# Patient Record
Sex: MALE | Race: OTHER | NOT HISPANIC OR LATINO | URBAN - METROPOLITAN AREA
[De-identification: names, ages, dates, MRNs, and addresses within clinical notes are randomized per-mention and may not be internally consistent; named-entity substitution may affect disease eponyms.]

---

## 2017-07-19 VITALS
HEART RATE: 65 BPM | DIASTOLIC BLOOD PRESSURE: 56 MMHG | SYSTOLIC BLOOD PRESSURE: 110 MMHG | WEIGHT: 164.02 LBS | RESPIRATION RATE: 18 BRPM | TEMPERATURE: 97 F | HEIGHT: 65 IN

## 2017-07-19 RX ORDER — CHLORHEXIDINE GLUCONATE 213 G/1000ML
1 SOLUTION TOPICAL ONCE
Qty: 0 | Refills: 0 | Status: DISCONTINUED | OUTPATIENT
Start: 2017-07-20 | End: 2017-07-21

## 2017-07-19 NOTE — H&P ADULT - PMH
Coronary artery disease    Hyperlipidemia    Hypertension    Systolic CHF, chronic    Vitamin B12 deficiency    Vitamin D deficiency

## 2017-07-19 NOTE — H&P ADULT - ASSESSMENT
62 y.o English/Welsh Speaking Male Current Smoker with PMHx of hypertension, hyperlipidemia, Chronic Systolic HF with unknown EF s/p ICD in ~2015 (last interrogated ~ 2weeks ago-no events noted as per pt's daughter), Known CAD with hx of STEMI 09/2013 s/p KB thrombectomy/KB mRCA @ [facility] [place] on 09/02/13  followed by diagnostic cardiac cath @ [facility] [place] on 10/02/13 for CP post MI revealed patent RCA stent, who presents for recommended Cardiac Cath with possible intervention if clinically indicated secondary to CCS Anginal Class 4 symptoms in the setting of an abnormal Stress test.        ASA: III and Mallampati: III  ***OF NOTE: Pt was loaded with ASA 325mg PO X 1 and Plavix 600mg PO X 1 prior to procedure today. 62 y.o English/Albanian Speaking Male Current Smoker with PMHx of hypertension, hyperlipidemia, Chronic Systolic HF with unknown EF s/p ICD in ~2016 (last interrogated ~ 2 months ago-no events noted as per pt's daughter), Known CAD with hx of STEMI 09/2013 s/p KB thrombectomy/KB mRCA @ [facility] on 09/02/13  followed by diagnostic cardiac cath @ [facility] on 10/02/13 for CP post MI revealed patent RCA stent, who presents for recommended Cardiac Cath with possible intervention if clinically indicated secondary to CCS Anginal Class 4 symptoms in the setting of an abnormal Stress test.        ASA: III and Mallampati: III  ***OF NOTE: Pre-procedure K+ = 6.2  hemolyzed, repeat to be drawn the room. Pt was loaded with ASA 325mg PO X 1 and Plavix 600mg PO X 1 prior to procedure today.

## 2017-07-19 NOTE — H&P ADULT - NSHPSOCIALHISTORY_GEN_ALL_CORE
Current smoker.  Smokes 1.5PPD X 50yrs, currently uses e-cigarettes/day  Denies  ETOH or illicit drug use

## 2017-07-19 NOTE — H&P ADULT - HISTORY OF PRESENT ILLNESS
****HISTORY OBTAINED FROM PATIENT'S DAUGHTER ADELINA LINCOLN (POOR HISTORIAN) AND SUPPLEMENTED AS PER VERBAL DISCUSSION WITH REFERRING DR. ALEMAN    ****PT TO BRING IN STENT CARD      62 y.o English speaking Male Current Smoker with PMHx of hypertension, hyperlipidemia, Chronic Systolic HF with unknown EF s/p ICD in ~2015 (last interrogated ~ 2weeks ago-no events noted as per pt's daughter), who as per pt's daughter  presented to his cardiologist Dr. Aleman c/o non radiating sub sternal CP described as a pressure like sensation with accompanying palpitations occurring independent of physical activity over the past few months.  Symptoms last a few minutes and improve with rest or spontaneously on their own.  Denies SOB, dizziness, syncope, recent PND/orthopnea,  or LE edema.  Nuclear Stress test performed 07/08/17 revealed large inferior infarct with ischemia, no EF commented on.        In light of pt's risk factors, above CCS Anginal Class 4 Anginal symptoms, and an abnormal Stress test, pt is now referred to [facility] for recommended Cardiac Cath with possible intervention if clinically indicated to  r/o suspected progressive CAD. ****HISTORY OBTAINED FROM PATIENT'S DAUGHTER ADELINA LINCOLN (POOR HISTORIAN) AND SUPPLEMENTED AS PER VERBAL DISCUSSION WITH REFERRING DR. ALEMAN      62 y.o English/Slovak Speaking Male Current Smoker with PMHx of hypertension, hyperlipidemia, Chronic Systolic HF with unknown EF s/p ICD in ~2015 (last interrogated ~ 2weeks ago-no events noted as per pt's daughter), Known CAD with hx of STEMI 09/2013 s/p KB thrombectomy/KB mRCA @ [facility] on 09/02/13  followed by diagnostic cardiac cath @ [facility] on 10/02/13 for CP post MI revealed patent RCA stent, who as per pt's daughter  presented to his cardiologist Dr. Aleman c/o non radiating sub sternal CP described as a pressure like sensation with accompanying palpitations occurring independent of physical activity over the past few months.  Symptoms last a few minutes and improve with rest or spontaneously on their own.  Denies SOB, dizziness, syncope, recent PND/orthopnea,  or LE edema.  Nuclear Stress test performed 07/08/17 revealed large inferior infarct with ischemia, no EF commented on.        In light of pt's risk factors, above CCS Anginal Class 4 Anginal symptoms, and an abnormal Stress test, pt is now referred to [facility] for recommended Cardiac Cath with possible intervention if clinically indicated to  r/o suspected progressive CAD. ****HISTORY OBTAINED FROM PATIENT'S DAUGHTER ADELINA LINCOLN (POOR HISTORIAN) AND SUPPLEMENTED AS PER VERBAL DISCUSSION WITH REFERRING DR. ALEMAN      62 y.o English/Swedish Speaking Male Current Smoker with PMHx of hypertension, hyperlipidemia, Chronic Systolic HF with unknown EF s/p ICD in ~2016 (last interrogated ~ 2 months ago-no events noted as per pt's daughter), Known CAD with hx of STEMI 09/2013 s/p KB thrombectomy/KB mRCA @ [facility] on 09/02/13  followed by diagnostic cardiac cath @ [facility] on 10/02/13 for CP post MI revealed patent RCA stent, who as per pt's daughter  presented to his cardiologist Dr. Aleman c/o non radiating sub sternal CP described as a pressure like sensation with accompanying palpitations occurring independent of physical activity over the past few months.  Symptoms last a few minutes and improve with rest or spontaneously on their own.  Denies SOB, dizziness, syncope, recent PND/orthopnea,  or LE edema.  Nuclear Stress test performed 07/08/17 revealed large inferior infarct with ischemia, no EF commented on.        In light of pt's risk factors, above CCS Anginal Class 4 Anginal symptoms, and an abnormal Stress test, pt is now referred to [facility] for recommended Cardiac Cath with possible intervention if clinically indicated to  r/o suspected progressive CAD.

## 2017-07-20 ENCOUNTER — INPATIENT (INPATIENT)
Facility: HOSPITAL | Age: 62
LOS: 0 days | Discharge: HOME | DRG: 247 | End: 2017-07-21
Attending: INTERNAL MEDICINE | Admitting: INTERNAL MEDICINE
Payer: COMMERCIAL

## 2017-07-20 LAB
ALBUMIN SERPL ELPH-MCNC: 4.1 G/DL — SIGNIFICANT CHANGE UP (ref 3.3–5)
ALP SERPL-CCNC: 75 U/L — SIGNIFICANT CHANGE UP (ref 40–120)
ALT FLD-CCNC: 24 U/L — SIGNIFICANT CHANGE UP (ref 10–45)
ANION GAP SERPL CALC-SCNC: 10 MMOL/L — SIGNIFICANT CHANGE UP (ref 5–17)
ANION GAP SERPL CALC-SCNC: 11 MMOL/L — SIGNIFICANT CHANGE UP (ref 5–17)
APTT BLD: 29 SEC — SIGNIFICANT CHANGE UP (ref 27.5–37.4)
AST SERPL-CCNC: 36 U/L — SIGNIFICANT CHANGE UP (ref 10–40)
BASOPHILS NFR BLD AUTO: 0.3 % — SIGNIFICANT CHANGE UP (ref 0–2)
BILIRUB SERPL-MCNC: 0.2 MG/DL — SIGNIFICANT CHANGE UP (ref 0.2–1.2)
BUN SERPL-MCNC: 15 MG/DL — SIGNIFICANT CHANGE UP (ref 7–23)
BUN SERPL-MCNC: 16 MG/DL — SIGNIFICANT CHANGE UP (ref 7–23)
CALCIUM SERPL-MCNC: 8.1 MG/DL — LOW (ref 8.4–10.5)
CALCIUM SERPL-MCNC: 8.6 MG/DL — SIGNIFICANT CHANGE UP (ref 8.4–10.5)
CHLORIDE SERPL-SCNC: 105 MMOL/L — SIGNIFICANT CHANGE UP (ref 96–108)
CHLORIDE SERPL-SCNC: 109 MMOL/L — HIGH (ref 96–108)
CHOLEST SERPL-MCNC: 154 MG/DL — SIGNIFICANT CHANGE UP (ref 10–199)
CK MB CFR SERPL CALC: 1.7 NG/ML — SIGNIFICANT CHANGE UP (ref 0–6.7)
CO2 SERPL-SCNC: 22 MMOL/L — SIGNIFICANT CHANGE UP (ref 22–31)
CO2 SERPL-SCNC: 24 MMOL/L — SIGNIFICANT CHANGE UP (ref 22–31)
CREAT SERPL-MCNC: 1 MG/DL — SIGNIFICANT CHANGE UP (ref 0.5–1.3)
CREAT SERPL-MCNC: 1 MG/DL — SIGNIFICANT CHANGE UP (ref 0.5–1.3)
CRP SERPL-MCNC: 0.3 MG/DL — SIGNIFICANT CHANGE UP (ref 0–0.4)
EOSINOPHIL NFR BLD AUTO: 7.2 % — HIGH (ref 0–6)
GLUCOSE SERPL-MCNC: 115 MG/DL — HIGH (ref 70–99)
GLUCOSE SERPL-MCNC: 162 MG/DL — HIGH (ref 70–99)
HBA1C BLD-MCNC: 6.2 % — HIGH (ref 4–5.6)
HCT VFR BLD CALC: 40.5 % — SIGNIFICANT CHANGE UP (ref 39–50)
HDLC SERPL-MCNC: 29 MG/DL — LOW (ref 40–125)
HGB BLD-MCNC: 13.4 G/DL — SIGNIFICANT CHANGE UP (ref 13–17)
INR BLD: 0.95 — SIGNIFICANT CHANGE UP (ref 0.88–1.16)
LIPID PNL WITH DIRECT LDL SERPL: 79 MG/DL — SIGNIFICANT CHANGE UP
LYMPHOCYTES # BLD AUTO: 29.8 % — SIGNIFICANT CHANGE UP (ref 13–44)
MCHC RBC-ENTMCNC: 28.3 PG — SIGNIFICANT CHANGE UP (ref 27–34)
MCHC RBC-ENTMCNC: 33.1 G/DL — SIGNIFICANT CHANGE UP (ref 32–36)
MCV RBC AUTO: 85.4 FL — SIGNIFICANT CHANGE UP (ref 80–100)
MONOCYTES NFR BLD AUTO: 9.2 % — SIGNIFICANT CHANGE UP (ref 2–14)
NEUTROPHILS NFR BLD AUTO: 53.5 % — SIGNIFICANT CHANGE UP (ref 43–77)
PLATELET # BLD AUTO: 188 K/UL — SIGNIFICANT CHANGE UP (ref 150–400)
POTASSIUM SERPL-MCNC: 3.6 MMOL/L — SIGNIFICANT CHANGE UP (ref 3.5–5.3)
POTASSIUM SERPL-MCNC: 6.2 MMOL/L — CRITICAL HIGH (ref 3.5–5.3)
POTASSIUM SERPL-SCNC: 3.6 MMOL/L — SIGNIFICANT CHANGE UP (ref 3.5–5.3)
POTASSIUM SERPL-SCNC: 6.2 MMOL/L — CRITICAL HIGH (ref 3.5–5.3)
PROT SERPL-MCNC: 7.5 G/DL — SIGNIFICANT CHANGE UP (ref 6–8.3)
PROTHROM AB SERPL-ACNC: 10.5 SEC — SIGNIFICANT CHANGE UP (ref 9.8–12.7)
RBC # BLD: 4.74 M/UL — SIGNIFICANT CHANGE UP (ref 4.2–5.8)
RBC # FLD: 14 % — SIGNIFICANT CHANGE UP (ref 10.3–16.9)
SODIUM SERPL-SCNC: 138 MMOL/L — SIGNIFICANT CHANGE UP (ref 135–145)
SODIUM SERPL-SCNC: 143 MMOL/L — SIGNIFICANT CHANGE UP (ref 135–145)
TOTAL CHOLESTEROL/HDL RATIO MEASUREMENT: 5.3 RATIO — SIGNIFICANT CHANGE UP (ref 3.4–9.6)
TRIGL SERPL-MCNC: 228 MG/DL — HIGH (ref 10–149)
WBC # BLD: 6.6 K/UL — SIGNIFICANT CHANGE UP (ref 3.8–10.5)
WBC # FLD AUTO: 6.6 K/UL — SIGNIFICANT CHANGE UP (ref 3.8–10.5)

## 2017-07-20 PROCEDURE — 93010 ELECTROCARDIOGRAM REPORT: CPT

## 2017-07-20 PROCEDURE — 93010 ELECTROCARDIOGRAM REPORT: CPT | Mod: 77

## 2017-07-20 PROCEDURE — 99223 1ST HOSP IP/OBS HIGH 75: CPT

## 2017-07-20 PROCEDURE — 92928 PRQ TCAT PLMT NTRAC ST 1 LES: CPT | Mod: LC

## 2017-07-20 PROCEDURE — 93458 L HRT ARTERY/VENTRICLE ANGIO: CPT | Mod: 26,XU

## 2017-07-20 RX ORDER — METOPROLOL TARTRATE 50 MG
1 TABLET ORAL
Qty: 0 | Refills: 0 | COMMUNITY

## 2017-07-20 RX ORDER — ATORVASTATIN CALCIUM 80 MG/1
10 TABLET, FILM COATED ORAL AT BEDTIME
Qty: 0 | Refills: 0 | Status: DISCONTINUED | OUTPATIENT
Start: 2017-07-20 | End: 2017-07-20

## 2017-07-20 RX ORDER — CHOLECALCIFEROL (VITAMIN D3) 125 MCG
1 CAPSULE ORAL
Qty: 0 | Refills: 0 | COMMUNITY

## 2017-07-20 RX ORDER — CLOPIDOGREL BISULFATE 75 MG/1
75 TABLET, FILM COATED ORAL DAILY
Qty: 0 | Refills: 0 | Status: DISCONTINUED | OUTPATIENT
Start: 2017-07-20 | End: 2017-07-21

## 2017-07-20 RX ORDER — MAGNESIUM OXIDE 400 MG ORAL TABLET 241.3 MG
1 TABLET ORAL
Qty: 0 | Refills: 0 | COMMUNITY

## 2017-07-20 RX ORDER — ASPIRIN/CALCIUM CARB/MAGNESIUM 324 MG
81 TABLET ORAL DAILY
Qty: 0 | Refills: 0 | Status: DISCONTINUED | OUTPATIENT
Start: 2017-07-20 | End: 2017-07-21

## 2017-07-20 RX ORDER — ATORVASTATIN CALCIUM 80 MG/1
40 TABLET, FILM COATED ORAL AT BEDTIME
Qty: 0 | Refills: 0 | Status: DISCONTINUED | OUTPATIENT
Start: 2017-07-20 | End: 2017-07-21

## 2017-07-20 RX ORDER — PREGABALIN 225 MG/1
1 CAPSULE ORAL
Qty: 0 | Refills: 0 | COMMUNITY

## 2017-07-20 RX ORDER — LISINOPRIL 2.5 MG/1
40 TABLET ORAL DAILY
Qty: 0 | Refills: 0 | Status: DISCONTINUED | OUTPATIENT
Start: 2017-07-20 | End: 2017-07-21

## 2017-07-20 RX ORDER — ASPIRIN/CALCIUM CARB/MAGNESIUM 324 MG
325 TABLET ORAL ONCE
Qty: 0 | Refills: 0 | Status: COMPLETED | OUTPATIENT
Start: 2017-07-20 | End: 2017-07-20

## 2017-07-20 RX ORDER — LISINOPRIL 2.5 MG/1
1 TABLET ORAL
Qty: 0 | Refills: 0 | COMMUNITY

## 2017-07-20 RX ORDER — PREGABALIN 225 MG/1
1000 CAPSULE ORAL DAILY
Qty: 0 | Refills: 0 | Status: DISCONTINUED | OUTPATIENT
Start: 2017-07-20 | End: 2017-07-21

## 2017-07-20 RX ORDER — CLOPIDOGREL BISULFATE 75 MG/1
600 TABLET, FILM COATED ORAL ONCE
Qty: 0 | Refills: 0 | Status: COMPLETED | OUTPATIENT
Start: 2017-07-20 | End: 2017-07-20

## 2017-07-20 RX ORDER — METOPROLOL TARTRATE 50 MG
100 TABLET ORAL
Qty: 0 | Refills: 0 | Status: DISCONTINUED | OUTPATIENT
Start: 2017-07-20 | End: 2017-07-21

## 2017-07-20 RX ORDER — ASPIRIN/CALCIUM CARB/MAGNESIUM 324 MG
1 TABLET ORAL
Qty: 0 | Refills: 0 | COMMUNITY

## 2017-07-20 RX ORDER — ACETAMINOPHEN 500 MG
650 TABLET ORAL ONCE
Qty: 0 | Refills: 0 | Status: COMPLETED | OUTPATIENT
Start: 2017-07-20 | End: 2017-07-21

## 2017-07-20 RX ORDER — SODIUM CHLORIDE 9 MG/ML
1000 INJECTION INTRAMUSCULAR; INTRAVENOUS; SUBCUTANEOUS
Qty: 0 | Refills: 0 | Status: DISCONTINUED | OUTPATIENT
Start: 2017-07-20 | End: 2017-07-21

## 2017-07-20 RX ORDER — SODIUM CHLORIDE 9 MG/ML
500 INJECTION, SOLUTION INTRAVENOUS
Qty: 0 | Refills: 0 | Status: DISCONTINUED | OUTPATIENT
Start: 2017-07-20 | End: 2017-07-20

## 2017-07-20 RX ADMIN — Medication 100 MILLIGRAM(S): at 21:04

## 2017-07-20 RX ADMIN — SODIUM CHLORIDE 50 MILLILITER(S): 9 INJECTION, SOLUTION INTRAVENOUS at 09:10

## 2017-07-20 RX ADMIN — SODIUM CHLORIDE 50 MILLILITER(S): 9 INJECTION INTRAMUSCULAR; INTRAVENOUS; SUBCUTANEOUS at 12:39

## 2017-07-20 RX ADMIN — CLOPIDOGREL BISULFATE 600 MILLIGRAM(S): 75 TABLET, FILM COATED ORAL at 09:11

## 2017-07-20 RX ADMIN — PREGABALIN 1000 MICROGRAM(S): 225 CAPSULE ORAL at 16:09

## 2017-07-20 RX ADMIN — ATORVASTATIN CALCIUM 40 MILLIGRAM(S): 80 TABLET, FILM COATED ORAL at 21:04

## 2017-07-20 RX ADMIN — LISINOPRIL 40 MILLIGRAM(S): 2.5 TABLET ORAL at 17:34

## 2017-07-20 RX ADMIN — Medication 325 MILLIGRAM(S): at 09:11

## 2017-07-20 NOTE — CONSULT NOTE ADULT - SUBJECTIVE AND OBJECTIVE BOX
CHIEF COMPLAINT: CAD    HISTORY OF PRESENT ILLNESS:  62 y.o English/Armenian Speaking Male Current Smoker with PMHx of hypertension, hyperlipidemia, Chronic Systolic HF with unknown EF s/p ICD in ~2016 (last interrogated ~ 2 months ago-no events noted as per pt's daughter), Known CAD with hx of STEMI 09/2013 s/p KB thrombectomy/KB mRCA @ [facility] on 09/02/13  followed by diagnostic cardiac cath @ [facility] on 10/02/13 for CP post MI revealed patent RCA stent, who as per pt's daughter  presented to his cardiologist Dr. Aleman c/o non radiating sub sternal CP described as a pressure like sensation with accompanying palpitations occurring independent of physical activity over the past few months.  Symptoms last a few minutes and improve with rest or spontaneously on their own.  Denies SOB, dizziness, syncope, recent PND/orthopnea,  or LE edema.  Nuclear Stress test performed 07/08/17 revealed large inferior infarct with ischemia, no EF commented on.  Patient is s/p cath 7/20:  KB to 70% mid LCx, prior mRCA stent patent, LV EF 35-40%.     PAST MEDICAL & SURGICAL HISTORY:  Vitamin B12 deficiency  Vitamin D deficiency  Coronary artery disease  Systolic CHF, chronic  Hyperlipidemia  Hypertension    FAMILY HISTORY:   No pertinent family history in first degree relatives    ALLERGIES: NKDA    HOME MEDICATIONS:  · 	Ecotrin Adult Low Strength 81 mg oral delayed release tablet: 1 tab(s) orally once a day, Last Dose Taken:    · 	pravastatin 20 mg oral tablet: 1 tab(s) orally once a day, Last Dose Taken:    · 	lisinopril 40 mg oral tablet: 1 tab(s) orally once a day, Last Dose Taken:    · 	magnesium oxide 250 mg oral tablet: 1 tab(s) orally 2 times a day, Last Dose Taken:    · 	Metoprolol Succinate  mg oral tablet, extended release: 1 tab(s) orally 2 times a day, Last Dose Taken:    · 	Vitamin B12 1000 mcg oral tablet: 1 tab(s) orally once a day, Last Dose Taken:    · 	Vitamin D3 2000 intl units oral tablet: 1 tab(s) orally once a day, Last Dose Taken:    	    PHYSICAL EXAM:  T(C): 36.4 (07-20-17 @ 14:58), Max: 36.4 (07-20-17 @ 14:58)  T(F): 97.6 (07-20-17 @ 14:58), Max: 97.6 (07-20-17 @ 14:58)  HR: 75 (07-20-17 @ 16:10) (58 - 75)  BP: 103/60 (07-20-17 @ 16:10) (103/60 - 146/70)  RR: 16 (07-20-17 @ 16:10) (16 - 17)  SpO2: 97% (07-20-17 @ 16:10) (97% - 98%)  Height (cm): 165.1 (07-20 @ 13:06)  Weight (kg): 75 (07-20 @ 13:06)  BMI (kg/m2): 27.5 (07-20 @ 13:06)    	  LABS:	                        13.4   6.6   )-----------( 188      ( 20 Jul 2017 08:09 )             40.5     07-20    143  |  109<H>  |  15  ----------------------------<  162<H>  3.6   |  24  |  1.00    Ca    8.1<L>      20 Jul 2017 15:47    TPro  7.5  /  Alb  4.1  /  TBili  0.2  /  DBili  x   /  AST  36  /  ALT  24  /  AlkPhos  75  07-20      Hemoglobin A1C, Whole Blood: 6.2 % (07-20 @ 08:09)      Cholesterol, Serum: 154 mg/dL (07-20 @ 08:09)  HDL Cholesterol, Serum: 29 mg/dL (07-20 @ 08:09)  Triglycerides, Serum: 228 mg/dL (07-20 @ 08:09)  Direct LDL: 79 mg/dL (07-20 @ 08:09)    C-Reactive Protein, Serum: 0.30 mg/dL (07-20 @ 08:09)      10 "S" ASSESSMENT PLAN: SMOKING, SITTING, SUGAR, SALT, SOME FATS, SOCIAL, SLEEP, SIGNS, AND MEDS:  Tobacco usage: Patient is currently using an electronic cigarette.   Stress: His stress level is moderate.   ETOH: Denies.   Caffeine: He drinks coffee with sugar and tea with milk during the day.   Hormone Replacement: Denies.   Sleep Disorder: Denies.   Inflammatory Condition: Denies.  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   Current Diet: Breakfast) toast and jam with tea. Lunch) roti and fish or chicken chang. Dinner) same. Snacks/Desserts) fruit, salad, or yogurt.   Heart Failure: + sCHF (EF 35-40%)  Myopathy with Statins: He has a history of statin intolerance and has been started on Repatha by his cardiologist.   GI/ Issues: Denies.     ASSESSMENT/RECOMMENDATIONS: 	  Summary: 62 y.o English/Armenian Speaking Male Current Smoker with PMHx of hypertension, hyperlipidemia, Chronic Systolic HF with unknown EF s/p ICD in ~2016 (last interrogated ~ 2 months ago-no events noted as per pt's daughter), Known CAD with hx of STEMI 09/2013 s/p KB thrombectomy/KB mRCA @ [facility] on 09/02/13  followed by diagnostic cardiac cath @ [facility] on 10/02/13 for CP post MI revealed patent RCA stent, who as per pt's daughter  presented to his cardiologist Dr. Aleman c/o non radiating sub sternal CP described as a pressure like sensation with accompanying palpitations occurring independent of physical activity over the past few months.  Symptoms last a few minutes and improve with rest or spontaneously on their own.  Denies SOB, dizziness, syncope, recent PND/orthopnea,  or LE edema.  Nuclear Stress test performed 07/08/17 revealed large inferior infarct with ischemia, no EF commented on.  Patient is s/p cath 7/20:  KB to 70% mid LCx, prior mRCA stent patent, LV EF 35-40%.     RECOMMENDATIONS:   Anti-platelet Therapy: DAPT per interventionalist recommendation with aspirin and Plavix.   Lipid Therapy: Maximally tolerated statin along with PCSK9I is suggested per your discretion. Giving Crestor 20mg every other day or even once a week might benefit this patient.   Beta Blocker Therapy: Continue current therapy with metoprolol per your discretion.   ACE/ARB Therapy: Continue current therapy with lisinopril per your discretion.   Diet: Low-sodium, low-carbohydrate, Mediterranean diet. Written instruction on the Mediterranean diet was provided. We discussed his elevated A1C and ways to reduce his sugar through dietary modification and increased physical activity. The addition of metformin 500mg/d might additionally help him lose weight and help prevent progression to diabetes. A GLP-1 agonist might also assist this patient in weight loss and prevention of diabetes as well as confer some additional cardio protection.   Exercise: Cardiac rehab would likely benefit this patient.   Smoking: Smoking cessation encouraged. Patient was given resources to help him quit.   Stress Management: Instruction on mindfulness meditation was provided.   Sleep: Clinical evidence does not support the need for a sleep study at this time.     Thank you for the opportunity to see this patient. Please feel free to contact [facility] if there are any questions, or if you feel that your patient would benefit from continued follow-up visits with the Program.

## 2017-07-21 VITALS — WEIGHT: 158.07 LBS

## 2017-07-21 LAB
ANION GAP SERPL CALC-SCNC: 11 MMOL/L — SIGNIFICANT CHANGE UP (ref 5–17)
BUN SERPL-MCNC: 17 MG/DL — SIGNIFICANT CHANGE UP (ref 7–23)
CALCIUM SERPL-MCNC: 8.3 MG/DL — LOW (ref 8.4–10.5)
CHLORIDE SERPL-SCNC: 106 MMOL/L — SIGNIFICANT CHANGE UP (ref 96–108)
CO2 SERPL-SCNC: 23 MMOL/L — SIGNIFICANT CHANGE UP (ref 22–31)
CREAT SERPL-MCNC: 1.1 MG/DL — SIGNIFICANT CHANGE UP (ref 0.5–1.3)
GLUCOSE SERPL-MCNC: 120 MG/DL — HIGH (ref 70–99)
HCT VFR BLD CALC: 40.9 % — SIGNIFICANT CHANGE UP (ref 39–50)
HGB BLD-MCNC: 12.8 G/DL — LOW (ref 13–17)
MAGNESIUM SERPL-MCNC: 2 MG/DL — SIGNIFICANT CHANGE UP (ref 1.6–2.6)
MCHC RBC-ENTMCNC: 27.5 PG — SIGNIFICANT CHANGE UP (ref 27–34)
MCHC RBC-ENTMCNC: 31.3 G/DL — LOW (ref 32–36)
MCV RBC AUTO: 88 FL — SIGNIFICANT CHANGE UP (ref 80–100)
PLATELET # BLD AUTO: 173 K/UL — SIGNIFICANT CHANGE UP (ref 150–400)
POTASSIUM SERPL-MCNC: 4 MMOL/L — SIGNIFICANT CHANGE UP (ref 3.5–5.3)
POTASSIUM SERPL-SCNC: 4 MMOL/L — SIGNIFICANT CHANGE UP (ref 3.5–5.3)
RBC # BLD: 4.65 M/UL — SIGNIFICANT CHANGE UP (ref 4.2–5.8)
RBC # FLD: 14 % — SIGNIFICANT CHANGE UP (ref 10.3–16.9)
SODIUM SERPL-SCNC: 140 MMOL/L — SIGNIFICANT CHANGE UP (ref 135–145)
WBC # BLD: 5.6 K/UL — SIGNIFICANT CHANGE UP (ref 3.8–10.5)
WBC # FLD AUTO: 5.6 K/UL — SIGNIFICANT CHANGE UP (ref 3.8–10.5)

## 2017-07-21 PROCEDURE — 99239 HOSP IP/OBS DSCHRG MGMT >30: CPT

## 2017-07-21 RX ORDER — CLOPIDOGREL BISULFATE 75 MG/1
1 TABLET, FILM COATED ORAL
Qty: 0 | Refills: 0 | COMMUNITY
Start: 2017-07-21

## 2017-07-21 RX ORDER — ASPIRIN/CALCIUM CARB/MAGNESIUM 324 MG
1 TABLET ORAL
Qty: 30 | Refills: 11 | OUTPATIENT
Start: 2017-07-21 | End: 2018-07-15

## 2017-07-21 RX ORDER — ATORVASTATIN CALCIUM 80 MG/1
1 TABLET, FILM COATED ORAL
Qty: 30 | Refills: 3 | OUTPATIENT
Start: 2017-07-21 | End: 2017-11-17

## 2017-07-21 RX ORDER — ATORVASTATIN CALCIUM 80 MG/1
1 TABLET, FILM COATED ORAL
Qty: 0 | Refills: 0 | COMMUNITY
Start: 2017-07-21

## 2017-07-21 RX ORDER — CLOPIDOGREL BISULFATE 75 MG/1
1 TABLET, FILM COATED ORAL
Qty: 30 | Refills: 11 | OUTPATIENT
Start: 2017-07-21 | End: 2018-07-15

## 2017-07-21 RX ADMIN — Medication 650 MILLIGRAM(S): at 01:13

## 2017-07-21 RX ADMIN — CLOPIDOGREL BISULFATE 75 MILLIGRAM(S): 75 TABLET, FILM COATED ORAL at 05:49

## 2017-07-21 RX ADMIN — Medication 650 MILLIGRAM(S): at 00:06

## 2017-07-21 RX ADMIN — LISINOPRIL 40 MILLIGRAM(S): 2.5 TABLET ORAL at 05:49

## 2017-07-21 RX ADMIN — PREGABALIN 1000 MICROGRAM(S): 225 CAPSULE ORAL at 12:22

## 2017-07-21 RX ADMIN — Medication 81 MILLIGRAM(S): at 05:49

## 2017-07-21 RX ADMIN — Medication 100 MILLIGRAM(S): at 05:49

## 2017-07-21 NOTE — DISCHARGE NOTE ADULT - PLAN OF CARE
continue taking medications, please follow-up with your doctor You underwent a cardiac angiogram and received a stent to your mid left circumflex artery. PLEASE CONTINUE ASPIRIN 81 MG DAILY AND PLAVIX 75 MG DAILY. DO NOT STOP THESE MEDICATIONS FOR ANY REASON AS THEY ARE KEEPING YOUR STENT OPEN AND PREVENTING A HEART ATTACK. Avoid a strenuous activity or heavy lifting for the next five days. Do not take a bath or swim for the next five days, you may shower. For any bleeding or hematoma formation (hardened blood collection under the skin at the access of the right wrist please hold pressure and go to the nearest emergency room. Please follow-up with Dr. Heraclio Aleman in 1-2 weeks. please stop taking Pravastatin and start taking Lipitor 40 mg one a day at night Please stop taking Pravastatin and start taking Lipitor 40 mg once a day at night. The prescription was sent to your pharmacy. High cholesterol contributes to heart diseases. Previously you complained about muscle cramps when you took Pravastatin, we switched you on Lipitor. If you continue to have similar symptoms, please follow-up with your outpatient cardiologist. continue medications Please continue taking your medications listed above to keep your blood pressure controlled. For blood pressure that is too high or too low please see your doctor or go to the emergency room as needed. continue taking your medications Please continue taking all of your medications, measure your weight every day in the morning and drink less than one liter of fluids daily. If you notice shortness of breath or swelling in the legs, please see your doctor or go to the nearest emergency room as needed.

## 2017-07-21 NOTE — DISCHARGE NOTE ADULT - MEDICATION SUMMARY - MEDICATIONS TO TAKE
I will START or STAY ON the medications listed below when I get home from the hospital:    Ecotrin Adult Low Strength 81 mg oral delayed release tablet  -- 1 tab(s) by mouth once a day  -- Indication: For to keep the stent open    Ecotrin Adult Low Strength 81 mg oral delayed release tablet  -- 1 tab(s) by mouth once a day  -- Swallow whole.  Do not crush.  Take with food or milk.    -- Indication: For to keep the stent open    lisinopril 40 mg oral tablet  -- 1 tab(s) by mouth once a day  -- Indication: For for high blood pressure    pravastatin 20 mg oral tablet  -- 1 tab(s) by mouth once a day  -- Indication: For dont take this medication anymore for high cholestrerol, was changed to atorvastatin 40 mg    atorvastatin 40 mg oral tablet  -- 1 tab(s) by mouth once a day (at bedtime)  -- Indication: For for high cholesterol    atorvastatin 40 mg oral tablet  -- 1 tab(s) by mouth once a day (at bedtime)  -- Avoid grapefruit and grapefruit juice while taking this medication.  Do not take this drug if you are pregnant.  It is very important that you take or use this exactly as directed.  Do not skip doses or discontinue unless directed by your doctor.  Obtain medical advice before taking any non-prescription drugs as some may affect the action of this medication.  Take with food or milk.    -- Indication: For as prescribed    Plavix 75 mg oral tablet  -- 1 tab(s) by mouth once a day  -- Do not take aspirin or aspirin containing products without knowledge and consent of your physician.    -- Indication: For to keep the sten open    clopidogrel 75 mg oral tablet  -- 1 tab(s) by mouth once a day  -- Indication: For to keep the stent open    Metoprolol Succinate  mg oral tablet, extended release  -- 1 tab(s) by mouth 2 times a day  -- Indication: For for high blood pressure    magnesium oxide 250 mg oral tablet  -- 1 tab(s) by mouth 2 times a day  -- Indication: For as prescribed    Vitamin B12 1000 mcg oral tablet  -- 1 tab(s) by mouth once a day  -- Indication: For as prescribed    Vitamin D3 2000 intl units oral tablet  -- 1 tab(s) by mouth once a day  -- Indication: For as prescribed

## 2017-07-21 NOTE — DISCHARGE NOTE ADULT - PATIENT PORTAL LINK FT
“You can access the [facility] Patient Portal, offered by [facility], by registering with the following website: [email or web address]”

## 2017-07-21 NOTE — DISCHARGE NOTE ADULT - PROVIDER TOKENS
FREE:[LAST:[Jas],FIRST:[Ash],PHONE:[phone]],FAX:[(   [phone]    -],ADDRESS:[[address] [place] [address]]]

## 2017-07-21 NOTE — DISCHARGE NOTE ADULT - HOSPITAL COURSE
62 y.o English/Sinhala Speaking Male Current Smoker with PMHx of hypertension, hyperlipidemia, Chronic Systolic HF with unknown EF s/p ICD in ~2016 (last interrogated ~ 2 months ago-no events noted as per pt's daughter), Known CAD with hx of STEMI 09/2013 s/p KB thrombectomy/KB mRCA @ [facility] on 09/02/13  followed by diagnostic cardiac cath @ [facility] on 10/02/13 for CP post MI revealed patent RCA stent, who as per pt's daughter  presented to his cardiologist Dr. Aleman c/o non radiating sub sternal CP described as a pressure like sensation with accompanying palpitations occurring independent of physical activity over the past few months.  Symptoms last a few minutes and improve with rest or spontaneously on their own.  Denies SOB, dizziness, syncope, recent PND/orthopnea,  or LE edema.  Nuclear Stress test performed 07/08/17 revealed large inferior infarct with ischemia, no EF commented on.  Patient is s/p cath 7/20:  KB to 70% mid LCx, prior mRCA stent patent, LV EF 35-40%. Pt underwent cardiac angiogram on 7/20/17 and received KB into 70 % mid LCx, prior mRCA stent patent, LV EF 35-40%, patient had radial access. Patient was seen in AM and did not have any complains. Patient VSS , labs wnl, physical exam wnl and no events overnight on telemetry. On the exam the right radial access is stable without hematoma or ooze. Pt was prescribed Lipitor 40 mg PO x1 daily, ASA 81 mg PO x1 daily and Plavix 75 mg PO x1 daily to [facility] [address] [place] [address], tel [phone]. Pt is stable for discharge per Dr. Quintero. Patient has been given appropriate discharge instructions including medication regimen, access site management and follow-up.    Of Note: Pt claims that he was intolerant to Pravastatin, Dr. Lagunas changed pt to Lipitor 40. Pt was advised to follow-up with his outpatient cardiologist to start him on PCSK9 inhibitor.

## 2017-07-21 NOTE — DISCHARGE NOTE ADULT - CARE PLAN
Principal Discharge DX:	Coronary artery disease  Goal:	continue taking medications, please follow-up with your doctor  Instructions for follow-up, activity and diet:	You underwent a cardiac angiogram and received a stent to your mid left circumflex artery. PLEASE CONTINUE ASPIRIN 81 MG DAILY AND PLAVIX 75 MG DAILY. DO NOT STOP THESE MEDICATIONS FOR ANY REASON AS THEY ARE KEEPING YOUR STENT OPEN AND PREVENTING A HEART ATTACK. Avoid a strenuous activity or heavy lifting for the next five days. Do not take a bath or swim for the next five days, you may shower. For any bleeding or hematoma formation (hardened blood collection under the skin at the access of the right wrist please hold pressure and go to the nearest emergency room. Please follow-up with Dr. Heraclio Aleman in 1-2 weeks.  Secondary Diagnosis:	Hyperlipidemia  Goal:	please stop taking Pravastatin and start taking Lipitor 40 mg one a day at night  Instructions for follow-up, activity and diet:	Please stop taking Pravastatin and start taking Lipitor 40 mg once a day at night. The prescription was sent to your pharmacy. High cholesterol contributes to heart diseases. Previously you complained about muscle cramps when you took Pravastatin, we switched you on Lipitor. If you continue to have similar symptoms, please follow-up with your outpatient cardiologist.  Secondary Diagnosis:	Hypertension  Goal:	continue medications  Instructions for follow-up, activity and diet:	Please continue taking your medications listed above to keep your blood pressure controlled. For blood pressure that is too high or too low please see your doctor or go to the emergency room as needed.  Secondary Diagnosis:	Systolic CHF, chronic  Goal:	continue taking your medications  Instructions for follow-up, activity and diet:	Please continue taking all of your medications, measure your weight every day in the morning and drink less than one liter of fluids daily. If you notice shortness of breath or swelling in the legs, please see your doctor or go to the nearest emergency room as needed.

## 2017-07-21 NOTE — DISCHARGE NOTE ADULT - MEDICATION SUMMARY - MEDICATIONS TO STOP TAKING
I will STOP taking the medications listed below when I get home from the hospital:    pravastatin 20 mg oral tablet  -- 1 tab(s) by mouth once a day

## 2017-07-25 DIAGNOSIS — I50.22 CHRONIC SYSTOLIC (CONGESTIVE) HEART FAILURE: ICD-10-CM

## 2017-07-25 DIAGNOSIS — E55.9 VITAMIN D DEFICIENCY, UNSPECIFIED: ICD-10-CM

## 2017-07-25 DIAGNOSIS — F17.210 NICOTINE DEPENDENCE, CIGARETTES, UNCOMPLICATED: ICD-10-CM

## 2017-07-25 DIAGNOSIS — I25.10 ATHEROSCLEROTIC HEART DISEASE OF NATIVE CORONARY ARTERY WITHOUT ANGINA PECTORIS: ICD-10-CM

## 2017-07-25 DIAGNOSIS — Z95.5 PRESENCE OF CORONARY ANGIOPLASTY IMPLANT AND GRAFT: ICD-10-CM

## 2017-07-25 DIAGNOSIS — I11.0 HYPERTENSIVE HEART DISEASE WITH HEART FAILURE: ICD-10-CM

## 2017-07-25 DIAGNOSIS — I25.2 OLD MYOCARDIAL INFARCTION: ICD-10-CM

## 2017-07-25 DIAGNOSIS — E53.8 DEFICIENCY OF OTHER SPECIFIED B GROUP VITAMINS: ICD-10-CM

## 2017-07-25 DIAGNOSIS — Z79.82 LONG TERM (CURRENT) USE OF ASPIRIN: ICD-10-CM

## 2017-07-25 DIAGNOSIS — E78.5 HYPERLIPIDEMIA, UNSPECIFIED: ICD-10-CM

## 2017-12-15 PROCEDURE — 82550 ASSAY OF CK (CPK): CPT

## 2017-12-15 PROCEDURE — 85610 PROTHROMBIN TIME: CPT

## 2017-12-15 PROCEDURE — 80061 LIPID PANEL: CPT

## 2017-12-15 PROCEDURE — 85025 COMPLETE CBC W/AUTO DIFF WBC: CPT

## 2017-12-15 PROCEDURE — 85027 COMPLETE CBC AUTOMATED: CPT

## 2017-12-15 PROCEDURE — 80048 BASIC METABOLIC PNL TOTAL CA: CPT

## 2017-12-15 PROCEDURE — 93005 ELECTROCARDIOGRAM TRACING: CPT

## 2017-12-15 PROCEDURE — C1887: CPT

## 2017-12-15 PROCEDURE — 85730 THROMBOPLASTIN TIME PARTIAL: CPT

## 2017-12-15 PROCEDURE — 86140 C-REACTIVE PROTEIN: CPT

## 2017-12-15 PROCEDURE — 80053 COMPREHEN METABOLIC PANEL: CPT

## 2017-12-15 PROCEDURE — 83036 HEMOGLOBIN GLYCOSYLATED A1C: CPT

## 2017-12-15 PROCEDURE — C1769: CPT

## 2017-12-15 PROCEDURE — 83735 ASSAY OF MAGNESIUM: CPT

## 2017-12-15 PROCEDURE — 36415 COLL VENOUS BLD VENIPUNCTURE: CPT

## 2017-12-15 PROCEDURE — C1874: CPT

## 2017-12-15 PROCEDURE — 82553 CREATINE MB FRACTION: CPT

## 2021-05-19 NOTE — H&P ADULT - BP NONINVASIVE MEAN (MM HG)
Patient is calling asking to be seen again because he has increased pain in his rt shoulder  Patient was hoping for another steroid injection  I did let him know that he had one on 4/8/21 & the typical wait time is 3 months for another  Please advise if the patient could get another or what he can do for pain  He states that he has done some therapy through  which shows he went for eval & 1 session  He agrees that he went the 2 times that are noted in his chart       [phone]
